# Patient Record
Sex: FEMALE | Race: AMERICAN INDIAN OR ALASKA NATIVE
[De-identification: names, ages, dates, MRNs, and addresses within clinical notes are randomized per-mention and may not be internally consistent; named-entity substitution may affect disease eponyms.]

---

## 2020-07-12 ENCOUNTER — HOSPITAL ENCOUNTER (EMERGENCY)
Dept: HOSPITAL 43 - DL.ED | Age: 30
Discharge: HOME | End: 2020-07-12
Payer: COMMERCIAL

## 2020-07-12 VITALS — HEART RATE: 109 BPM | DIASTOLIC BLOOD PRESSURE: 94 MMHG | SYSTOLIC BLOOD PRESSURE: 139 MMHG

## 2020-07-12 DIAGNOSIS — O99.89: ICD-10-CM

## 2020-07-12 DIAGNOSIS — O23.42: Primary | ICD-10-CM

## 2020-07-12 DIAGNOSIS — R31.9: ICD-10-CM

## 2020-07-12 DIAGNOSIS — Z88.1: ICD-10-CM

## 2020-07-12 DIAGNOSIS — Z88.0: ICD-10-CM

## 2020-07-12 LAB
ANION GAP SERPL CALC-SCNC: 14.6 MEQ/L (ref 7–13)
APAP SERPL-SCNC: 0 UG/ML
CHLORIDE SERPL-SCNC: 105 MMOL/L (ref 98–107)
SODIUM SERPL-SCNC: 138 MMOL/L (ref 136–145)

## 2020-07-12 PROCEDURE — 87186 SC STD MICRODIL/AGAR DIL: CPT

## 2020-07-12 PROCEDURE — 87088 URINE BACTERIA CULTURE: CPT

## 2020-07-12 PROCEDURE — 87086 URINE CULTURE/COLONY COUNT: CPT

## 2020-07-12 PROCEDURE — 80053 COMPREHEN METABOLIC PANEL: CPT

## 2020-07-12 PROCEDURE — 81001 URINALYSIS AUTO W/SCOPE: CPT

## 2020-07-12 PROCEDURE — 99283 EMERGENCY DEPT VISIT LOW MDM: CPT

## 2020-07-12 PROCEDURE — 80305 DRUG TEST PRSMV DIR OPT OBS: CPT

## 2020-07-12 PROCEDURE — 85025 COMPLETE CBC W/AUTO DIFF WBC: CPT

## 2020-07-12 PROCEDURE — 80307 DRUG TEST PRSMV CHEM ANLYZR: CPT

## 2020-07-12 PROCEDURE — 36415 COLL VENOUS BLD VENIPUNCTURE: CPT

## 2020-07-12 NOTE — EDM.PDOC
ED HPI GENERAL MEDICAL PROBLEM





- General


Chief Complaint: OB/GYN Problem


Stated Complaint: MEDICAL CLEARANCE


Time Seen by Provider: 07/12/20 16:08


Source of Information: Reports: Patient


History Limitations: Reports: No Limitations





- History of Present Illness


INITIAL COMMENTS - FREE TEXT/NARRATIVE: 





This 31 yo female patient was brought to the ED by a [facility] officer due to the pat

ient being picked up and being 6 months pregnant. The patient reports she has 

not been having any problems other than minor cramping. The patient reports she 

has missed several of her OB appointments. The [facility] officer reports the patient 

was taken here due to their policy to get a medical clearance due to the patient

being pregnant. 


Onset: Today


Duration: Other (Patient reports no current problems )


Location: Reports: Other (No current complaints or problems)





- Related Data


                                    Allergies











Allergy/AdvReac Type Severity Reaction Status Date / Time


 


amoxicillin Allergy  Cannot Verified 07/12/20 17:16





   Remember  


 


Penicillins Allergy  Cannot Verified 07/12/20 17:16





   Remember  











Home Meds: 


                                    Home Meds





. [No Known Home Meds]  07/12/20 [History]











Past Medical History





- Past Health History


Medical/Surgical History: Denies Medical/Surgical History





ED ROS GENERAL





- Review of Systems


Review Of Systems: Comprehensive ROS is negative, except as noted in HPI.





ED EXAM, GENERAL





- Physical Exam


Exam: See Below


Exam Limited By: No Limitations


General Appearance: Alert, WD/WN, No Apparent Distress


Eye Exam: Bilateral Eye: EOMI, Normal Inspection, PERRL


Ears: Normal External Exam, Normal Canal, Hearing Grossly Normal, Normal TMs


Nose: Normal Inspection, Normal Mucosa, No Blood


Throat/Mouth: Normal Inspection, Normal Lips, Normal Teeth, Normal Gums, Normal 

Oropharynx, Normal Voice, No Airway Compromise


Head: Atraumatic, Normocephalic


Neck: Normal Inspection, Supple, Non-Tender, Full Range of Motion


Respiratory/Chest: No Respiratory Distress, Lungs Clear, Normal Breath Sounds, 

No Accessory Muscle Use, Chest Non-Tender


Cardiovascular: Normal Peripheral Pulses, Regular Rate, Rhythm, No Edema, No 

Gallop, No JVD, No Murmur, No Rub


GI/Abdominal: Normal Bowel Sounds, Soft, Non-Tender, No Organomegaly, No 

Distention, No Abnormal Bruit, No Mass, Pelvis Stable, Other (Fetal heart tones 

dopplered by Amelia DEWITT). Fetal heart rates were in the 140's.)


 (Female) Exam: Deferred


Rectal (Female) Exam: Deferred


Back Exam: Normal Inspection, Full Range of Motion, NT


Extremities: Other (The patient has a bruise to her left forearm)


Neurological: Alert, Oriented, CN II-XII Intact, Normal Cognition, Normal Gait, 

Normal Reflexes, No Motor/Sensory Deficits


Psychiatric: Depressed Mood, Tearful


Skin Exam: Warm, Dry, Intact, Normal Color, No Rash


Lymphatic: No Adenopathy





Course





- Vital Signs


Last Recorded V/S: 


                                Last Vital Signs











Temp  36.8 C   07/12/20 15:54


 


Pulse  109 H  07/12/20 15:54


 


Resp  20   07/12/20 15:54


 


BP  139/94 H  07/12/20 15:54


 


Pulse Ox  98   07/12/20 15:54














- Orders/Labs/Meds


Orders: 


                               Active Orders 24 hr











 Category Date Time Status


 


 CULTURE URINE [RM] Stat Lab  07/12/20 17:50 Received


 


 DRUG SCREEN URINE BIORAD [URCHEM] Stat Lab  07/12/20 17:50 Received


 


 UA W/MICROSCOPIC [URIN] Urgent Lab  07/12/20 17:50 Results











Labs: 


                                Laboratory Tests











  07/12/20 07/12/20 07/12/20 Range/Units





  16:29 16:29 16:29 


 


WBC  5.5    (5.0-10.0)  10^3/uL


 


RBC  3.69 L    (4.2-5.4)  10^6/uL


 


Hgb  10.5 L    (12.0-16.0)  g/dL


 


Hct  32.3 L    (37.0-47.0)  %


 


MCV  87.5  D    ()  fL


 


MCH  28.5    (27.0-34.0)  pg


 


MCHC  32.5 L    (33.0-35.0)  g/dL


 


Plt Count  294  D    (150-450)  10^3/uL


 


Neut % (Auto)  73.7    (42.2-75.2)  %


 


Lymph % (Auto)  17.2 L    (20.5-50.1)  %


 


Mono % (Auto)  7.1    (2-8)  %


 


Eos % (Auto)  1.8    (1.0-3.0)  %


 


Baso % (Auto)  0.2    (0.0-1.0)  %


 


Sodium   138   (136-145)  mmol/L


 


Potassium   3.6   (3.5-5.1)  mmol/L


 


Chloride   105   ()  mmol/L


 


Carbon Dioxide   22   (21-32)  mmol/L


 


Anion Gap   14.6 H   (7-13)  mEq/L


 


BUN   8   (7-18)  mg/dL


 


Creatinine   0.57   (0.55-1.02)  mg/dL


 


Est Cr Clr Drug Dosing   132.22   mL/min


 


Estimated GFR (MDRD)   > 60   


 


BUN/Creatinine Ratio   14.0   (No establ ref range)  


 


Glucose   87   (74-99)  mg/dL


 


Calcium   7.9 L   (8.5-10.1)  mg/dL


 


Total Bilirubin   0.3   (0.2-1.0)  mg/dL


 


AST   22   (15-37)  U/L


 


ALT   29   (14-59)  U/L


 


Alkaline Phosphatase   84   ()  U/L


 


Total Protein   6.4   (6.4-8.2)  g/dL


 


Albumin   2.5 L   (3.4-5.0)  g/dL


 


Globulin   3.9   


 


Albumin/Globulin Ratio   0.64   


 


Urine Color     (YELLOW)  


 


Urine Appearance     (CLEAR)  


 


Urine pH     (5.0-9.0)  


 


Ur Specific Gravity     (1.005-1.030)  


 


Urine Protein     (NEGATIVE)  


 


Urine Glucose (UA)     (NEGATIVE)  


 


Urine Ketones     (NEGATIVE)  


 


Urine Occult Blood     (NEGATIVE)  


 


Urine Nitrite     (NEGATIVE)  


 


Urine Bilirubin     (NEGATIVE)  


 


Urine Urobilinogen     (0.2-1.0)  mg/dL


 


Ur Leukocyte Esterase     (NEGATIVE)  


 


Salicylates    < 2.8 L  (2.8-20(Therapeutic))  mg/dL


 


Acetaminophen   0 L   (10-30 (Therapeutic))  ug/mL


 


Ethyl Alcohol   < 3   (0)  mg/dL














  07/12/20 Range/Units





  17:50 


 


WBC   (5.0-10.0)  10^3/uL


 


RBC   (4.2-5.4)  10^6/uL


 


Hgb   (12.0-16.0)  g/dL


 


Hct   (37.0-47.0)  %


 


MCV   ()  fL


 


MCH   (27.0-34.0)  pg


 


MCHC   (33.0-35.0)  g/dL


 


Plt Count   (150-450)  10^3/uL


 


Neut % (Auto)   (42.2-75.2)  %


 


Lymph % (Auto)   (20.5-50.1)  %


 


Mono % (Auto)   (2-8)  %


 


Eos % (Auto)   (1.0-3.0)  %


 


Baso % (Auto)   (0.0-1.0)  %


 


Sodium   (136-145)  mmol/L


 


Potassium   (3.5-5.1)  mmol/L


 


Chloride   ()  mmol/L


 


Carbon Dioxide   (21-32)  mmol/L


 


Anion Gap   (7-13)  mEq/L


 


BUN   (7-18)  mg/dL


 


Creatinine   (0.55-1.02)  mg/dL


 


Est Cr Clr Drug Dosing   mL/min


 


Estimated GFR (MDRD)   


 


BUN/Creatinine Ratio   (No establ ref range)  


 


Glucose   (74-99)  mg/dL


 


Calcium   (8.5-10.1)  mg/dL


 


Total Bilirubin   (0.2-1.0)  mg/dL


 


AST   (15-37)  U/L


 


ALT   (14-59)  U/L


 


Alkaline Phosphatase   ()  U/L


 


Total Protein   (6.4-8.2)  g/dL


 


Albumin   (3.4-5.0)  g/dL


 


Globulin   


 


Albumin/Globulin Ratio   


 


Urine Color  Deneen  (YELLOW)  


 


Urine Appearance  Cloudy  (CLEAR)  


 


Urine pH  5.5  (5.0-9.0)  


 


Ur Specific Gravity  >= 1.030  (1.005-1.030)  


 


Urine Protein  30 H  (NEGATIVE)  


 


Urine Glucose (UA)  Negative  (NEGATIVE)  


 


Urine Ketones  15 H  (NEGATIVE)  


 


Urine Occult Blood  Negative  (NEGATIVE)  


 


Urine Nitrite  Positive H  (NEGATIVE)  


 


Urine Bilirubin  Small H  (NEGATIVE)  


 


Urine Urobilinogen  1.0  (0.2-1.0)  mg/dL


 


Ur Leukocyte Esterase  Negative  (NEGATIVE)  


 


Salicylates   (2.8-20(Therapeutic))  mg/dL


 


Acetaminophen   (10-30 (Therapeutic))  ug/mL


 


Ethyl Alcohol   (0)  mg/dL











Meds: 


Medications














Discontinued Medications














Generic Name Dose Route Start Last Admin





  Trade Name Freq  PRN Reason Stop Dose Admin


 


Sodium Chloride  1,000 mls @ 999 mls/hr  07/12/20 16:56  07/12/20 17:08





  Normal Saline  IV  07/12/20 17:56  999 mls/hr





  .BOLUS ONE   Administration














Departure





- Departure


Time of Disposition: 18:19


Disposition: Home, Self-Care 01


Condition: Fair


Clinical Impression: 


 Medical clearance for incarceration





UTI (urinary tract infection)


Qualifiers:


 Urinary tract infection type: site unspecified Hematuria presence: with 

hematuria Qualified Code(s): N39.0 - Urinary tract infection, site not 

specified; R31.9 - Hematuria, unspecified








- Discharge Information


*PRESCRIPTION DRUG MONITORING PROGRAM REVIEWED*: Not Applicable


*COPY OF PRESCRIPTION DRUG MONITORING REPORT IN PATIENT [facility]AND: Not Applicable


Instructions:  Urinary Tract Infection, Adult, Easy-to-Read


Forms:  ED Department Discharge


Care Plan Goals: 


The patient was advised of the examination and lab results during the visit. The

 patient was given a liter of IV fluids and an oral dose of Keflex while in the 

ED. The patient was discharged with a script for Keflex (500 mg) to take 1 by 

mouth 3 times per day for 5 days. The patient was encouraged to follow-up with 

her primary care facility for continued prenatal care. If the patient has any 

additional symptoms or concerns, the patient should either return to the 

emergency department or visit her primary care facility. 





Sepsis Event Note (ED)





- Evaluation


Sepsis Screening Result: No Definite Risk





- Focused Exam


Vital Signs: 


                                   Vital Signs











  Temp Pulse Resp BP Pulse Ox


 


 07/12/20 15:54  36.8 C  109 H  20  139/94 H  98














- My Orders


Last 24 Hours: 


My Active Orders





07/12/20 17:50


CULTURE URINE [RM] Stat 


DRUG SCREEN URINE BIORAD [URCHEM] Stat 


UA W/MICROSCOPIC [URIN] Urgent 














- Assessment/Plan


Last 24 Hours: 


My Active Orders





07/12/20 17:50


CULTURE URINE [RM] Stat 


DRUG SCREEN URINE BIORAD [URCHEM] Stat 


UA W/MICROSCOPIC [URIN] Urgent